# Patient Record
Sex: FEMALE | Race: WHITE | NOT HISPANIC OR LATINO | Employment: FULL TIME | URBAN - METROPOLITAN AREA
[De-identification: names, ages, dates, MRNs, and addresses within clinical notes are randomized per-mention and may not be internally consistent; named-entity substitution may affect disease eponyms.]

---

## 2019-03-10 LAB
INFLUENZA A ANTIGEN, POC: POSITIVE
INFLUENZA B ANTIGEN, POC: NEGATIVE

## 2022-04-09 ENCOUNTER — HISTORICAL (OUTPATIENT)
Dept: ADMINISTRATIVE | Facility: HOSPITAL | Age: 52
End: 2022-04-09

## 2022-04-26 VITALS
OXYGEN SATURATION: 98 % | HEIGHT: 59 IN | BODY MASS INDEX: 25.38 KG/M2 | SYSTOLIC BLOOD PRESSURE: 118 MMHG | DIASTOLIC BLOOD PRESSURE: 84 MMHG | WEIGHT: 125.88 LBS

## 2022-05-25 PROBLEM — K21.9 GASTROESOPHAGEAL REFLUX DISEASE WITHOUT ESOPHAGITIS: Status: ACTIVE | Noted: 2022-05-25

## 2022-06-09 PROBLEM — R10.13 EPIGASTRIC PAIN: Status: ACTIVE | Noted: 2022-06-09

## 2022-06-09 PROBLEM — K59.00 CONSTIPATION: Status: ACTIVE | Noted: 2022-06-09

## 2022-06-09 PROBLEM — R11.0 NAUSEA: Status: ACTIVE | Noted: 2022-06-09

## 2022-09-15 ENCOUNTER — HISTORICAL (OUTPATIENT)
Dept: ADMINISTRATIVE | Facility: HOSPITAL | Age: 52
End: 2022-09-15
Payer: COMMERCIAL

## 2022-12-18 ENCOUNTER — OFFICE VISIT (OUTPATIENT)
Dept: URGENT CARE | Facility: CLINIC | Age: 52
End: 2022-12-18
Payer: COMMERCIAL

## 2022-12-18 VITALS
SYSTOLIC BLOOD PRESSURE: 128 MMHG | HEIGHT: 59 IN | WEIGHT: 128 LBS | BODY MASS INDEX: 25.8 KG/M2 | RESPIRATION RATE: 20 BRPM | OXYGEN SATURATION: 99 % | DIASTOLIC BLOOD PRESSURE: 76 MMHG | TEMPERATURE: 99 F

## 2022-12-18 DIAGNOSIS — R05.9 COUGH, UNSPECIFIED TYPE: ICD-10-CM

## 2022-12-18 DIAGNOSIS — B96.89 BACTERIAL SINUSITIS: Primary | ICD-10-CM

## 2022-12-18 DIAGNOSIS — J32.9 BACTERIAL SINUSITIS: Primary | ICD-10-CM

## 2022-12-18 LAB
CTP QC/QA: YES
MOLECULAR STREP A: NEGATIVE
POC MOLECULAR INFLUENZA A AGN: NEGATIVE
POC MOLECULAR INFLUENZA B AGN: NEGATIVE
SARS-COV-2 RDRP RESP QL NAA+PROBE: NEGATIVE

## 2022-12-18 PROCEDURE — 1159F PR MEDICATION LIST DOCUMENTED IN MEDICAL RECORD: ICD-10-PCS | Mod: CPTII,,, | Performed by: FAMILY MEDICINE

## 2022-12-18 PROCEDURE — 87651 POCT STREP A MOLECULAR: ICD-10-PCS | Mod: QW,,, | Performed by: FAMILY MEDICINE

## 2022-12-18 PROCEDURE — 87651 STREP A DNA AMP PROBE: CPT | Mod: QW,,, | Performed by: FAMILY MEDICINE

## 2022-12-18 PROCEDURE — 3078F DIAST BP <80 MM HG: CPT | Mod: CPTII,,, | Performed by: FAMILY MEDICINE

## 2022-12-18 PROCEDURE — 3074F PR MOST RECENT SYSTOLIC BLOOD PRESSURE < 130 MM HG: ICD-10-PCS | Mod: CPTII,,, | Performed by: FAMILY MEDICINE

## 2022-12-18 PROCEDURE — 3074F SYST BP LT 130 MM HG: CPT | Mod: CPTII,,, | Performed by: FAMILY MEDICINE

## 2022-12-18 PROCEDURE — 3008F PR BODY MASS INDEX (BMI) DOCUMENTED: ICD-10-PCS | Mod: CPTII,,, | Performed by: FAMILY MEDICINE

## 2022-12-18 PROCEDURE — 87635: ICD-10-PCS | Mod: QW,,, | Performed by: FAMILY MEDICINE

## 2022-12-18 PROCEDURE — 3078F PR MOST RECENT DIASTOLIC BLOOD PRESSURE < 80 MM HG: ICD-10-PCS | Mod: CPTII,,, | Performed by: FAMILY MEDICINE

## 2022-12-18 PROCEDURE — 3008F BODY MASS INDEX DOCD: CPT | Mod: CPTII,,, | Performed by: FAMILY MEDICINE

## 2022-12-18 PROCEDURE — 1159F MED LIST DOCD IN RCRD: CPT | Mod: CPTII,,, | Performed by: FAMILY MEDICINE

## 2022-12-18 PROCEDURE — 1160F RVW MEDS BY RX/DR IN RCRD: CPT | Mod: CPTII,,, | Performed by: FAMILY MEDICINE

## 2022-12-18 PROCEDURE — 1160F PR REVIEW ALL MEDS BY PRESCRIBER/CLIN PHARMACIST DOCUMENTED: ICD-10-PCS | Mod: CPTII,,, | Performed by: FAMILY MEDICINE

## 2022-12-18 PROCEDURE — 87502 INFLUENZA DNA AMP PROBE: CPT | Mod: QW,,, | Performed by: FAMILY MEDICINE

## 2022-12-18 PROCEDURE — 96372 THER/PROPH/DIAG INJ SC/IM: CPT | Mod: ,,, | Performed by: FAMILY MEDICINE

## 2022-12-18 PROCEDURE — 87502 POCT INFLUENZA A/B MOLECULAR: ICD-10-PCS | Mod: QW,,, | Performed by: FAMILY MEDICINE

## 2022-12-18 PROCEDURE — 99213 OFFICE O/P EST LOW 20 MIN: CPT | Mod: 25,,, | Performed by: FAMILY MEDICINE

## 2022-12-18 PROCEDURE — 87635 SARS-COV-2 COVID-19 AMP PRB: CPT | Mod: QW,,, | Performed by: FAMILY MEDICINE

## 2022-12-18 PROCEDURE — 96372 PR INJECTION,THERAP/PROPH/DIAG2ST, IM OR SUBCUT: ICD-10-PCS | Mod: ,,, | Performed by: FAMILY MEDICINE

## 2022-12-18 PROCEDURE — 99213 PR OFFICE/OUTPT VISIT, EST, LEVL III, 20-29 MIN: ICD-10-PCS | Mod: 25,,, | Performed by: FAMILY MEDICINE

## 2022-12-18 RX ORDER — BETAMETHASONE SODIUM PHOSPHATE AND BETAMETHASONE ACETATE 3; 3 MG/ML; MG/ML
12 INJECTION, SUSPENSION INTRA-ARTICULAR; INTRALESIONAL; INTRAMUSCULAR; SOFT TISSUE
Status: COMPLETED | OUTPATIENT
Start: 2022-12-18 | End: 2022-12-18

## 2022-12-18 RX ORDER — AZITHROMYCIN 250 MG/1
TABLET, FILM COATED ORAL
Qty: 6 TABLET | Refills: 0 | Status: SHIPPED | OUTPATIENT
Start: 2022-12-18 | End: 2022-12-23

## 2022-12-18 RX ORDER — BENZONATATE 200 MG/1
200 CAPSULE ORAL 3 TIMES DAILY PRN
Qty: 30 CAPSULE | Refills: 0 | Status: SHIPPED | OUTPATIENT
Start: 2022-12-18 | End: 2022-12-28

## 2022-12-18 RX ADMIN — BETAMETHASONE SODIUM PHOSPHATE AND BETAMETHASONE ACETATE 12 MG: 3; 3 INJECTION, SUSPENSION INTRA-ARTICULAR; INTRALESIONAL; INTRAMUSCULAR; SOFT TISSUE at 10:12

## 2022-12-18 NOTE — PROGRESS NOTES
"Subjective:       Patient ID: Dalila Frederick is a 52 y.o. female.    Vitals:  height is 4' 11" (1.499 m) and weight is 58.1 kg (128 lb). Her temperature is 98.7 °F (37.1 °C). Her blood pressure is 128/76. Her respiration is 20 and oxygen saturation is 99%.     Chief Complaint: Cough (Started Friday, congestion, slight chills, HA,  had flu around thanksgiving, )    HPI  ROS    Objective:      Physical Exam      Assessment:       1. Fever, unspecified fever cause            Plan:         Fever, unspecified fever cause  -     POCT COVID-19 Rapid Screening  -     POCT Influenza A/B MOLECULAR  -     POCT Strep A, Molecular                     "

## 2022-12-18 NOTE — PROGRESS NOTES
Patient ID: [id number]     Chief Complaint: upper respiratory tract infection symptoms    History of Present Illness:     Dalila Frederick is a 52 y.o. female  who presents today for symptoms of Cough (Started Friday, congestion, slight chills, HA,  had flu around thanksgiving, )    Pt denies experiencing any fevers, nausea, vomiting, difficulty breathing, dysphagia, or neck stiffness.    Past Medical History:     ----------------------------  Acid reflux     Past Surgical History:   Procedure Laterality Date    AUGMENTATION OF BREAST      EYE SURGERY Bilateral        Review of patient's allergies indicates:   Allergen Reactions    Sulfa (sulfonamide antibiotics) Rash       Outpatient Medications Marked as Taking for the 12/18/22 encounter (Office Visit) with Remington Foley MD   Medication Sig Dispense Refill    esomeprazole (NEXIUM) 20 mg GrPS Take 20 mg by mouth before breakfast. 30 each 1    multivitamin with minerals tablet Take 1 tablet by mouth once daily.       Current Facility-Administered Medications for the 12/18/22 encounter (Office Visit) with Remington Foley MD   Medication Dose Route Frequency Provider Last Rate Last Admin    betamethasone acetate-betamethasone sodium phosphate injection 12 mg  12 mg Intramuscular 1 time in Clinic/HOD Remington Foley MD           Social History     Socioeconomic History    Marital status:    Tobacco Use    Smoking status: Never    Smokeless tobacco: Never   Substance and Sexual Activity    Alcohol use: Yes     Comment: Socially    Drug use: Never        Family History   Problem Relation Age of Onset    No Known Problems Mother     Heart disease Father     Hypertension Father     No Known Problems Sister     No Known Problems Brother         Subjective:     Review of Systems   Constitutional:  Positive for chills. Negative for fever and malaise/fatigue.   HENT:  Positive for congestion. Negative for ear discharge, ear pain, sinus pain and sore throat.   "  Respiratory:  Negative for cough, sputum production, shortness of breath, wheezing and stridor.    Gastrointestinal:  Negative for abdominal pain, diarrhea, nausea and vomiting.   Genitourinary:  Negative for dysuria, frequency and urgency.   Musculoskeletal:  Negative for neck pain.   Skin:  Negative for rash.   Neurological:  Positive for headaches.     Objective:     /76   Temp 98.7 °F (37.1 °C)   Resp 20   Ht 4' 11" (1.499 m)   Wt 58.1 kg (128 lb)   SpO2 99%   BMI 25.85 kg/m²     Physical Exam  Constitutional:       General: She is not in acute distress.     Appearance: Normal appearance. She is not ill-appearing or toxic-appearing.   HENT:      Head: Normocephalic and atraumatic.      Right Ear: Tympanic membrane and ear canal normal.      Left Ear: Tympanic membrane and ear canal normal.      Nose: No congestion or rhinorrhea.      Mouth/Throat:      Pharynx: Oropharynx is clear. No oropharyngeal exudate or posterior oropharyngeal erythema.   Eyes:      General:         Right eye: No discharge.         Left eye: No discharge.      Extraocular Movements: Extraocular movements intact.      Conjunctiva/sclera: Conjunctivae normal.   Cardiovascular:      Rate and Rhythm: Normal rate and regular rhythm.      Heart sounds: Normal heart sounds. No murmur heard.    No gallop.   Pulmonary:      Effort: Pulmonary effort is normal. No respiratory distress.      Breath sounds: Normal breath sounds. No stridor. No wheezing, rhonchi or rales.   Chest:      Chest wall: No tenderness.   Musculoskeletal:      Cervical back: No rigidity or tenderness.   Lymphadenopathy:      Cervical: No cervical adenopathy.   Neurological:      Mental Status: She is alert and oriented to person, place, and time. Mental status is at baseline.   Psychiatric:         Mood and Affect: Mood normal.         Behavior: Behavior normal.       Assessment & Plan:       ICD-10-CM ICD-9-CM   1. Bacterial sinusitis  J32.9 473.9    B96.89 041.9 "   2. Cough, unspecified type  R05.9 786.2        1. Bacterial sinusitis  -     POCT COVID-19 Rapid Screening  -     POCT Influenza A/B MOLECULAR  -     POCT Strep A, Molecular  -     betamethasone acetate-betamethasone sodium phosphate injection 12 mg  -     azithromycin (Z-TEA) 250 MG tablet; Take 2 tablets by mouth on day 1; Take 1 tablet by mouth on days 2-5  Dispense: 6 tablet; Refill: 0    2. Cough, unspecified type  -     betamethasone acetate-betamethasone sodium phosphate injection 12 mg  -     benzonatate (TESSALON) 200 MG capsule; Take 1 capsule (200 mg total) by mouth 3 (three) times daily as needed for Cough.  Dispense: 30 capsule; Refill: 0      Strep negative, Influenza negative, and Covid negative. Pt will return  if symptoms change, worsen, or do not resolved within the expected time range.

## 2024-07-27 ENCOUNTER — OFFICE VISIT (OUTPATIENT)
Dept: URGENT CARE | Facility: CLINIC | Age: 54
End: 2024-07-27
Payer: COMMERCIAL

## 2024-07-27 VITALS
SYSTOLIC BLOOD PRESSURE: 118 MMHG | RESPIRATION RATE: 18 BRPM | BODY MASS INDEX: 27.21 KG/M2 | OXYGEN SATURATION: 98 % | DIASTOLIC BLOOD PRESSURE: 74 MMHG | HEART RATE: 74 BPM | WEIGHT: 135 LBS | TEMPERATURE: 98 F | HEIGHT: 59 IN

## 2024-07-27 DIAGNOSIS — U07.1 COVID-19: ICD-10-CM

## 2024-07-27 DIAGNOSIS — J02.9 SORE THROAT: Primary | ICD-10-CM

## 2024-07-27 LAB
CTP QC/QA: YES
SARS-COV-2 AG RESP QL IA.RAPID: POSITIVE

## 2024-07-27 PROCEDURE — 96372 THER/PROPH/DIAG INJ SC/IM: CPT | Mod: ,,, | Performed by: FAMILY MEDICINE

## 2024-07-27 PROCEDURE — 99213 OFFICE O/P EST LOW 20 MIN: CPT | Mod: 25,,, | Performed by: FAMILY MEDICINE

## 2024-07-27 PROCEDURE — 87811 SARS-COV-2 COVID19 W/OPTIC: CPT | Mod: QW,,, | Performed by: FAMILY MEDICINE

## 2024-07-27 RX ORDER — DEXAMETHASONE SODIUM PHOSPHATE 100 MG/10ML
10 INJECTION INTRAMUSCULAR; INTRAVENOUS
Status: COMPLETED | OUTPATIENT
Start: 2024-07-27 | End: 2024-07-27

## 2024-07-27 RX ORDER — CHLOPHEDIANOL HCL AND PYRILAMINE MALEATE 12.5; 12.5 MG/5ML; MG/5ML
10 SOLUTION ORAL
Qty: 200 ML | Refills: 0 | Status: SHIPPED | OUTPATIENT
Start: 2024-07-27

## 2024-07-27 RX ADMIN — DEXAMETHASONE SODIUM PHOSPHATE 10 MG: 100 INJECTION INTRAMUSCULAR; INTRAVENOUS at 12:07

## 2024-07-27 NOTE — PROGRESS NOTES
"Subjective:      Patient ID: Dalila Frederick is a 53 y.o. female.    Vitals:  height is 4' 11" (1.499 m) and weight is 61.2 kg (135 lb). Her temperature is 98.2 °F (36.8 °C). Her blood pressure is 118/74 and her pulse is 74. Her respiration is 18 and oxygen saturation is 98%.     Chief Complaint: Sore Throat    Patient is a 53 y.o. female who presents to urgent care with complaints of sore throat, congestion, fatigue. Mild symptoms began on Monday, worsened throughout the week. Alleviating factors include Robitussin, leftover Tessalon with mild amount of relief. Patient denies any known fever, chest pain, SOB, or any other symptoms. She reports that her  currently has covid.     Sore Throat   Associated symptoms include coughing. Pertinent negatives include no congestion, ear pain, shortness of breath, stridor or trouble swallowing.     Constitution: Positive for fatigue. Negative for chills, sweating and fever.   HENT:  Positive for sore throat. Negative for ear pain, congestion, postnasal drip, sinus pain, sinus pressure and trouble swallowing.    Neck: neck negative.   Cardiovascular: Negative.    Eyes: Negative.    Respiratory:  Positive for cough. Negative for chest tightness, sputum production, bloody sputum, shortness of breath, stridor and wheezing.    Gastrointestinal: Negative.    Endocrine: negative.   Genitourinary: Negative.    Musculoskeletal: Negative.    Skin: Negative.    Allergic/Immunologic: Negative.    Neurological: Negative.  Negative for disorientation and altered mental status.   Hematologic/Lymphatic: Negative.    Psychiatric/Behavioral:  Negative for altered mental status, disorientation and confusion.       Objective:     Physical Exam   Constitutional: She is oriented to person, place, and time. She appears well-developed. She is cooperative.  Non-toxic appearance. She does not appear ill. No distress.   HENT:   Head: Normocephalic and atraumatic.   Ears:   Right Ear: Hearing, " tympanic membrane, external ear and ear canal normal.   Left Ear: Hearing, tympanic membrane, external ear and ear canal normal.   Nose: Nose normal. No mucosal edema, rhinorrhea or nasal deformity. No epistaxis. Right sinus exhibits no maxillary sinus tenderness and no frontal sinus tenderness. Left sinus exhibits no maxillary sinus tenderness and no frontal sinus tenderness.   Mouth/Throat: Uvula is midline, oropharynx is clear and moist and mucous membranes are normal. No trismus in the jaw. Normal dentition. No uvula swelling. No oropharyngeal exudate, posterior oropharyngeal edema or posterior oropharyngeal erythema.   Eyes: Conjunctivae and lids are normal. No scleral icterus.   Neck: Trachea normal and phonation normal. Neck supple. No edema present. No erythema present. No neck rigidity present.   Cardiovascular: Normal rate, regular rhythm, normal heart sounds and normal pulses.   Pulmonary/Chest: Effort normal and breath sounds normal. No respiratory distress. She has no decreased breath sounds. She has no rhonchi.   Abdominal: Normal appearance.   Musculoskeletal: Normal range of motion.         General: No deformity. Normal range of motion.   Neurological: She is alert and oriented to person, place, and time. She exhibits normal muscle tone. Coordination normal.   Skin: Skin is warm, dry, intact, not diaphoretic and not pale.   Psychiatric: Her speech is normal and behavior is normal. Judgment and thought content normal.   Nursing note and vitals reviewed.         Previous History      Review of patient's allergies indicates:   Allergen Reactions    Sulfa (sulfonamide antibiotics) Rash       Past Medical History:   Diagnosis Date    Acid reflux      Current Outpatient Medications   Medication Instructions    amoxicillin (AMOXIL) 875 mg, Oral, Every 12 hours    azelastine (OPTIVAR) 0.05 % ophthalmic solution 1 drop, Right Eye, 2 times daily    benzonatate (TESSALON) 200 mg, Oral, 3 times daily PRN     "cyclobenzaprine (FLEXERIL) 5 MG tablet Take 1 to 2 tab(s) Oral up to every 8 hours as needed for muscle spasm    diazePAM (VALIUM) 5 MG tablet Take 1 or 2 Tab(s) Oral up to every 6 hours as needed for Muscle Spasm. May take 1 hour prior to procedure as needed for Acute Anxiety    esomeprazole (NEXIUM) 20 mg, Oral, Before breakfast    gabapentin (NEURONTIN) 300 MG capsule TAKE 1 CAPSULE BY MOUTH EVERY DAY FOR 5 DAYS, THEN EVERY 12 HOURS FOR 5 DAYS, THEN EVERY 8 HOURS    meloxicam (MOBIC) 15 mg, Oral, Daily PRN    multivitamin with minerals tablet 1 tablet, Oral, Daily    pyrilamine-chlophedianoL (NINJACOF) 12.5-12.5 mg/5 mL Liqd 10 mLs, Oral, Every 6-8 hours PRN     Past Surgical History:   Procedure Laterality Date    AUGMENTATION OF BREAST      EYE SURGERY Bilateral      Family History   Problem Relation Name Age of Onset    No Known Problems Mother      Heart disease Father      Hypertension Father      No Known Problems Sister      No Known Problems Brother         Social History     Tobacco Use    Smoking status: Never    Smokeless tobacco: Never   Substance Use Topics    Alcohol use: Yes     Comment: occasional    Drug use: Never        Physical Exam      Vital Signs Reviewed   /74   Pulse 74   Temp 98.2 °F (36.8 °C)   Resp 18   Ht 4' 11" (1.499 m)   Wt 61.2 kg (135 lb)   SpO2 98%   BMI 27.27 kg/m²        Procedures    Procedures     Labs     Results for orders placed or performed in visit on 07/27/24   SARS Coronavirus 2 Antigen, POCT Manual Read   Result Value Ref Range    SARS Coronavirus 2 Antigen Positive (A) Negative     Acceptable Yes       Assessment:     1. Sore throat    2. COVID-19        Plan:   COVID Positive  Start multi vitamin daily. Current CDC guidelines recommend that you stay home until you are fever free for at least 24  hours without the use of fever reducing medications.  Treat your symptoms as you would the common cold with over the counter medications. If you " live with anybody, isolate yourself in a separate bedroom and use a separate bathroom. If your symptoms worsen or you develop shortness of breath or a fever over 103, seek medical attention immediately.       Sore throat  -     SARS Coronavirus 2 Antigen, POCT Manual Read    COVID-19    Other orders  -     pyrilamine-chlophedianoL (NINJACOF) 12.5-12.5 mg/5 mL Liqd; Take 10 mLs by mouth every 6 to 8 hours as needed (cough).  Dispense: 200 mL; Refill: 0  -     dexAMETHasone injection 10 mg

## 2024-07-27 NOTE — PATIENT INSTRUCTIONS
COVID Positive  Start multi vitamin daily. Current CDC guidelines recommend that you stay home until you are fever free for at least 24  hours without the use of fever reducing medications.  Treat your symptoms as you would the common cold with over the counter medications. If you live with anybody, isolate yourself in a separate bedroom and use a separate bathroom. If your symptoms worsen or you develop shortness of breath or a fever over 103, seek medical attention immediately.

## 2024-07-31 ENCOUNTER — OFFICE VISIT (OUTPATIENT)
Dept: URGENT CARE | Facility: CLINIC | Age: 54
End: 2024-07-31
Payer: COMMERCIAL

## 2024-07-31 VITALS
DIASTOLIC BLOOD PRESSURE: 64 MMHG | BODY MASS INDEX: 27.21 KG/M2 | HEART RATE: 83 BPM | TEMPERATURE: 99 F | WEIGHT: 135 LBS | OXYGEN SATURATION: 100 % | RESPIRATION RATE: 16 BRPM | SYSTOLIC BLOOD PRESSURE: 108 MMHG | HEIGHT: 59 IN

## 2024-07-31 DIAGNOSIS — J02.9 SORE THROAT: Primary | ICD-10-CM

## 2024-07-31 DIAGNOSIS — U07.1 COVID-19: ICD-10-CM

## 2024-07-31 LAB
CTP QC/QA: YES
MOLECULAR STREP A: NEGATIVE

## 2024-07-31 RX ORDER — PREDNISONE 10 MG/1
30 TABLET ORAL DAILY
Qty: 15 TABLET | Refills: 0 | Status: SHIPPED | OUTPATIENT
Start: 2024-07-31 | End: 2024-08-05

## 2024-07-31 RX ORDER — LIDOCAINE HYDROCHLORIDE 20 MG/ML
SOLUTION OROPHARYNGEAL
Qty: 200 ML | Refills: 0 | Status: SHIPPED | OUTPATIENT
Start: 2024-07-31

## 2024-07-31 NOTE — PROGRESS NOTES
"Subjective:      Patient ID: Dalila Frederick is a 53 y.o. female.    Vitals:  height is 4' 11" (1.499 m) and weight is 61.2 kg (135 lb). Her temperature is 98.5 °F (36.9 °C). Her blood pressure is 108/64 and her pulse is 83. Her respiration is 16 and oxygen saturation is 100%.     Chief Complaint: Sore Throat    Patient is a 53 y.o. female who presents to urgent care with complaints of a sore throat. She was seen here on 7/27/24 and was positive for covid. She had a sore throat then, but she reports that it has worsened since DOS. Alleviating factors include Advil, chloraseptic spray, and gargling with salt water with moderate amount of relief. Patient denies fever, chest pain, SOB, or any other symptoms.     Sore Throat   Associated symptoms include coughing and ear pain. Pertinent negatives include no congestion or trouble swallowing.       Constitution: Negative for chills, sweating, fatigue and fever.   HENT:  Positive for ear pain, postnasal drip and sore throat. Negative for congestion, sinus pain, sinus pressure and trouble swallowing.    Neck: neck negative.   Cardiovascular: Negative.    Eyes: Negative.    Respiratory:  Positive for cough.    Gastrointestinal: Negative.    Endocrine: negative.   Genitourinary: Negative.    Musculoskeletal: Negative.    Skin: Negative.    Allergic/Immunologic: Negative.    Neurological: Negative.  Negative for disorientation and altered mental status.   Hematologic/Lymphatic: Negative.    Psychiatric/Behavioral:  Negative for altered mental status, disorientation and confusion.       Objective:     Physical Exam   Constitutional: She is oriented to person, place, and time. She appears well-developed. She is cooperative.  Non-toxic appearance. She does not appear ill. No distress.   HENT:   Head: Normocephalic and atraumatic.   Ears:   Right Ear: Hearing, tympanic membrane, external ear and ear canal normal.   Left Ear: Hearing, tympanic membrane, external ear and ear " canal normal.   Nose: Nose normal. No mucosal edema, rhinorrhea or nasal deformity. No epistaxis. Right sinus exhibits no maxillary sinus tenderness and no frontal sinus tenderness. Left sinus exhibits no maxillary sinus tenderness and no frontal sinus tenderness.   Mouth/Throat: Uvula is midline, oropharynx is clear and moist and mucous membranes are normal. Mucous membranes are moist. No trismus in the jaw. Normal dentition. No uvula swelling. No oropharyngeal exudate, posterior oropharyngeal edema or posterior oropharyngeal erythema.      Comments: Postnasal drip  Eyes: Conjunctivae and lids are normal. No scleral icterus.   Neck: Trachea normal and phonation normal. Neck supple. No edema present. No erythema present. No neck rigidity present.   Cardiovascular: Normal rate, regular rhythm, normal heart sounds and normal pulses.   Pulmonary/Chest: Effort normal and breath sounds normal. No respiratory distress. She has no decreased breath sounds. She has no rhonchi.   Abdominal: Normal appearance.   Musculoskeletal: Normal range of motion.         General: No deformity. Normal range of motion.   Neurological: She is alert and oriented to person, place, and time. She exhibits normal muscle tone. Coordination normal.   Skin: Skin is warm, dry, intact, not diaphoretic and not pale.   Psychiatric: Her speech is normal and behavior is normal. Judgment and thought content normal.   Nursing note and vitals reviewed.         Previous History      Review of patient's allergies indicates:   Allergen Reactions    Sulfa (sulfonamide antibiotics) Rash       Past Medical History:   Diagnosis Date    Acid reflux      Current Outpatient Medications   Medication Instructions    amoxicillin (AMOXIL) 875 mg, Oral, Every 12 hours    azelastine (OPTIVAR) 0.05 % ophthalmic solution 1 drop, Right Eye, 2 times daily    benzonatate (TESSALON) 200 mg, Oral, 3 times daily PRN    cyclobenzaprine (FLEXERIL) 5 MG tablet Take 1 to 2 tab(s) Oral  "up to every 8 hours as needed for muscle spasm    diazePAM (VALIUM) 5 MG tablet Take 1 or 2 Tab(s) Oral up to every 6 hours as needed for Muscle Spasm. May take 1 hour prior to procedure as needed for Acute Anxiety    esomeprazole (NEXIUM) 20 mg, Oral, Before breakfast    gabapentin (NEURONTIN) 300 MG capsule TAKE 1 CAPSULE BY MOUTH EVERY DAY FOR 5 DAYS, THEN EVERY 12 HOURS FOR 5 DAYS, THEN EVERY 8 HOURS    LIDOcaine viscous HCl 2% (XYLOCAINE) 2 % Soln Swish and spit 15 mL every 6-8 hours as needed for sore throat    meloxicam (MOBIC) 15 mg, Oral, Daily PRN    multivitamin with minerals tablet 1 tablet, Oral, Daily    predniSONE (DELTASONE) 30 mg, Oral, Daily    pyrilamine-chlophedianoL (NINJACOF) 12.5-12.5 mg/5 mL Liqd 10 mLs, Oral, Every 6-8 hours PRN     Past Surgical History:   Procedure Laterality Date    AUGMENTATION OF BREAST      EYE SURGERY Bilateral      Family History   Problem Relation Name Age of Onset    No Known Problems Mother      Heart disease Father      Hypertension Father      No Known Problems Sister      No Known Problems Brother         Social History     Tobacco Use    Smoking status: Never    Smokeless tobacco: Never   Substance Use Topics    Alcohol use: Yes     Comment: occasional    Drug use: Never        Physical Exam      Vital Signs Reviewed   /64   Pulse 83   Temp 98.5 °F (36.9 °C)   Resp 16   Ht 4' 11" (1.499 m)   Wt 61.2 kg (135 lb)   SpO2 100%   BMI 27.27 kg/m²        Procedures    Procedures     Labs     Results for orders placed or performed in visit on 07/31/24   POCT Strep A, Molecular   Result Value Ref Range    Molecular Strep A, POC Negative Negative     Acceptable Yes       Assessment:     1. Sore throat    2. COVID-19        Plan:   Strep test is negative.  Sore throat is an expected finding with COVID-19  Medications sent to pharmacy. Increase fluids intake to prevent dehydration. You may take Tylenol and ibuprofen as directed if needed. "   Begin over-the-counter nasal steroid spray, Flonase  Warm saltwater gargles for sore throat. Warm water with honey to help coat the throat. Throat lozenges. Chloraseptic spray for worsening sore throat.  Practice good hand hygiene to include frequent hand washing to lessen the likelihood of transmission. Return or seek immediate medical attention for any new or worsening symptoms such as trouble breathing, continued high fever, neck stiffness, rash, or if you do not get better as expected.      Sore throat  -     POCT Strep A, Molecular    COVID-19    Other orders  -     predniSONE (DELTASONE) 10 MG tablet; Take 3 tablets (30 mg total) by mouth once daily. for 5 days  Dispense: 15 tablet; Refill: 0  -     LIDOcaine viscous HCl 2% (XYLOCAINE) 2 % Soln; Swish and spit 15 mL every 6-8 hours as needed for sore throat  Dispense: 200 mL; Refill: 0

## 2024-07-31 NOTE — PATIENT INSTRUCTIONS
Strep test is negative  Medications sent to pharmacy. Increase fluids intake to prevent dehydration. You may take Tylenol and ibuprofen as directed if needed.   Begin over-the-counter nasal steroid spray, Flonase  Warm saltwater gargles for sore throat. Warm water with honey to help coat the throat. Throat lozenges. Chloraseptic spray for worsening sore throat.  Practice good hand hygiene to include frequent hand washing to lessen the likelihood of transmission. Return or seek immediate medical attention for any new or worsening symptoms such as trouble breathing, continued high fever, neck stiffness, rash, or if you do not get better as expected.